# Patient Record
Sex: MALE | Race: WHITE | Employment: FULL TIME | URBAN - METROPOLITAN AREA
[De-identification: names, ages, dates, MRNs, and addresses within clinical notes are randomized per-mention and may not be internally consistent; named-entity substitution may affect disease eponyms.]

---

## 2017-01-05 ENCOUNTER — OFFICE VISIT (OUTPATIENT)
Dept: NEUROLOGY | Age: 52
End: 2017-01-05

## 2017-01-05 VITALS
HEIGHT: 69 IN | BODY MASS INDEX: 27.99 KG/M2 | HEART RATE: 67 BPM | WEIGHT: 189 LBS | DIASTOLIC BLOOD PRESSURE: 78 MMHG | RESPIRATION RATE: 16 BRPM | OXYGEN SATURATION: 95 % | TEMPERATURE: 97.9 F | SYSTOLIC BLOOD PRESSURE: 122 MMHG

## 2017-01-05 DIAGNOSIS — R41.3 MEMORY LOSS: Primary | ICD-10-CM

## 2017-01-05 DIAGNOSIS — R47.89 WORD FINDING DIFFICULTY: ICD-10-CM

## 2017-01-05 DIAGNOSIS — F43.22 ADJUSTMENT DISORDER WITH ANXIETY: ICD-10-CM

## 2017-01-05 DIAGNOSIS — R42 DIZZY SPELLS: ICD-10-CM

## 2017-01-05 NOTE — PATIENT INSTRUCTIONS
Information Regarding Testing     If you have physican order for a test or a medication denied by your insurance company, this does not mean the test or medication is not appropriate for you as that is a medical decision, not a decision to be made by an insurance company representative or by an [facility] physician who has not interviewed and examined you. This is a decision to be made between you and your physician. The denial of services is a contractual matter between you and your insurance company, not an issue between your physician and the insurance company. If your test or medication is denied, you can take the following steps to help resolve the issue:    1. File a complaint with the [facility] regarding your insurance company's denial of services ordered for you. You can do this either by calling them directly or by completing an on-line complaint form on the [employer]. This can be found at www.[employer]    2. Also file a formal complaint with your insurance company and ask to have the name of the person denying the service so that you may explore a legal option should you be harmed by this denial of service. Again, the fact the insurance company will not pay for the service does not mean it is not medically necessary and I would encourage you to follow through with the plan that was made with your physician    3. File a written complaint with your employer so your employer and benefit manager is aware of the poor coverage they are providing their employees. If you have medicare/medicaid, complain to your representative in the House and to your Bina Ambrose.         [facility]   Statement to Patients  April 1, 2014      In an effort to ensure the large volume of patient prescription refills is processed in the most efficient and expeditious manner, we are asking our patients to assist us by calling your Pharmacy for all prescription refills, this will include also your  Mail Order Pharmacy. The pharmacy will contact our office electronically to continue the refill process. Please do not wait until the last minute to call your pharmacy. We need at least 48 hours (2days) to fill prescriptions. We also encourage you to call your pharmacy before going to  your prescription to make sure it is ready. With regard to controlled substance prescription refill requests (narcotic refills) that need to be picked up at our office, we ask your cooperation by providing us with at least 72 hours (3days) notice that you will need a refill. We will not refill narcotic prescription refill requests after 4:00pm on any weekday, Monday through Thursday, or after 2:00pm on Fridays, or on the weekends. We encourage everyone to explore another way of getting your prescription refill request processed using [employer], our patient web portal through our electronic medical record system. [employer] is an efficient and effective way to communicate your medication request directly to the office and  downloadable as an deangelo on your smart phone . [employer] also features a review functionality that allows you to view your medication list as well as leave messages for your physician. Are you ready to get connected? If so please review the attatched instructions or speak to any of our staff to get you set up right away! Thank you so much for your cooperation. Should you have any questions please contact our Practice Administrator. The Physicians and Staff,  [facility]         If we have ordered testing for you, we do not call patients with results and we do not give test results over the phone. We schedule follow up appointments so that your results can be discussed in person and any questions you have regarding them may be addressed.   If something of concern is revealed on your test, we will call you for a sooner follow up appointment. Additionally, results may be found by using the My Chart feature and one of our patient service representatives at the  can give you instructions on how to access this feature of our electronic medical record system. Thank you for choosing [facility] for your     care. You may receive a survey about your visit. We appreciate you taking time     to complete this survey as we use your feedback to improve our services. We     realize we are not perfect, but we strive to provide excellent care. Learning About Living [place]  What is a living will? A living will is a legal form you use to write down the kind of care you want at the end of your life. It is used by the health professionals who will treat you if you aren't able to decide for yourself. If you put your wishes in writing, your loved ones and others will know what kind of care you want. They won't need to guess. This can ease your mind and be helpful to others. A living will is not the same as an estate or property will. An estate will explains what you want to happen with your money and property after you die. Is a living will a legal document? A living will is a legal document. Each state has its own laws about living tanner. If you move to another state, make sure that your living will is legal in the state where you now live. Or you might use a universal form that has been approved by many states. This kind of form can sometimes be completed and stored online. Your electronic copy will then be available wherever you have a connection to the Internet. In most cases, doctors will respect your wishes even if you have a form from a different state. · You don't need an  to complete a living will.  But legal advice can be helpful if your state's laws are unclear, your health history is complicated, or your family can't agree on what should be in your living will.  · You can change your living will at any time. Some people find that their wishes about end-of-life care change as their health changes. · In addition to making a living will, think about completing a medical power of  form. This form lets you name the person you want to make end-of-life treatment decisions for you (your \"health care agent\") if you're not able to. Many hospitals and nursing homes will give you the forms you need to complete a living will and a medical power of . · Your living will is used only if you can't make or communicate decisions for yourself anymore. If you become able to make decisions again, you can accept or refuse any treatment, no matter what you wrote in your living will. · Your state may offer an online registry. This is a place where you can store your living will online so the doctors and nurses who need to treat you can find it right away. What should you think about when creating a living will? Talk about your end-of-life wishes with your family members and your doctor. Let them know what you want. That way the people making decisions for you won't be surprised by your choices. Think about these questions as you make your living will:  · Do you know enough about life support methods that might be used? If not, talk to your doctor so you know what might be done if you can't breathe on your own, your heart stops, or you're unable to swallow. · What things would you still want to be able to do after you receive life-support methods? Would you want to be able to walk? To speak? To eat on your own? To live without the help of machines? · If you have a choice, where do you want to be cared for? In your home? At a hospital or nursing home? · Do you want certain Nondenominational practices performed if you become very ill? · If you have a choice at the end of your life, where would you prefer to die? At home? In a hospital or nursing home? Somewhere else?   · Would you prefer to be buried or cremated? · Do you want your organs to be donated after you die? What should you do with your living will? · Make sure that your family members and your health care agent have copies of your living will. · Give your doctor a copy of your living will to keep in your medical record. If you have more than one doctor, make sure that each one has a copy. · You may want to put a copy of your living will where it can be easily found. Where can you learn more? Go to http://santo-leslie.info/. Enter M512 in the search box to learn more about \"Learning About Living [place]. \"  Current as of: February 24, 2016  Content Version: 11.1  © 1059-7208 [employer]. Care instructions adapted under license by [employer] (which disclaims liability or warranty for this information). If you have questions about a medical condition or this instruction, always ask your healthcare professional. [address] any warranty or liability for your use of this information.

## 2017-01-05 NOTE — PROGRESS NOTES
Follow up for results for spells and memory loss. No significant changes in vertigo or slurred speech at times since last visit. Continues with difficulty with word finding. No acute problems problems.

## 2017-01-05 NOTE — MR AVS SNAPSHOT
Visit Information Date & Time Provider Department Dept. Phone Encounter #  
 1/5/2017  8:20 AM Iva Potts MD Neurology [facility] [phone] [id number] Upcoming Health Maintenance Date Due DTaP/Tdap/Td series (1 - Tdap) 10/27/1988 INFLUENZA AGE 9 TO ADULT 8/1/2016 Allergies as of 1/5/2017  Review Complete On: 1/5/2017 By: Iva Potts MD  
 No Known Allergies Current Immunizations  Never Reviewed No immunizations on file. Not reviewed this visit You Were Diagnosed With   
  
 Codes Comments Memory loss    -  Primary ICD-10-CM: R41.3 ICD-9-CM: 780.93 Vitals BP Pulse Temp Resp Height(growth percentile) Weight(growth percentile) 122/78 67 97.9 °F (36.6 °C) 16 5' 9\" (1.753 m) 189 lb (85.7 kg) SpO2 BMI Smoking Status 95% 27.91 kg/m2 Former Smoker Vitals History BMI and BSA Data Body Mass Index Body Surface Area  
 27.91 kg/m 2 2.04 m 2 Your Updated Medication List  
  
   
This list is accurate as of: 1/5/17  9:00 AM.  Always use your most recent med list. amLODIPine-benazepril 5-10 mg per capsule Commonly known as:  LOTREL  
TK 1 C PO QD  
  
 atorvastatin 20 mg tablet Commonly known as:  LIPITOR Take  by mouth daily. meclizine 25 mg tablet Commonly known as:  ANTIVERT Take  by mouth three (3) times daily as needed. meloxicam 15 mg tablet Commonly known as:  MOBIC  
15 mg as needed. TYLENOL PM PO Take  by mouth Before breakfast, lunch, dinner and at bedtime. Referral Information Referral ID Referred By Referred To  
  
 [id number] JUSTICE, [address], PsyD [place] [address] [facility], [address] [facility] Phone: [phone] Fax: [phone] Visits Status Start Date End Date 1 New Request 1/5/17 1/5/18 If your referral has a status of pending review or denied, additional information will be sent to support the outcome of this decision. Patient Instructions Information Regarding Testing If you have physican order for a test or a medication denied by your insurance company, this does not mean the test or medication is not appropriate for you as that is a medical decision, not a decision to be made by an insurance company representative or by an [facility] physician who has not interviewed and examined you. This is a decision to be made between you and your physician. The denial of services is a contractual matter between you and your insurance company, not an issue between your physician and the insurance company. If your test or medication is denied, you can take the following steps to help resolve the issue: 1. File a complaint with the [facility] of Insurance regarding your insurance company's denial of services ordered for you. You can do this either by calling them directly or by completing an on-line complaint form on the [employer]. This can be found at www.[place].[employer] 2. Also file a formal complaint with your insurance company and ask to have the name of the person denying the service so that you may explore a legal option should you be harmed by this denial of service. Again, the fact the insurance company will not pay for the service does not mean it is not medically necessary and I would encourage you to follow through with the plan that was made with your physician 3. File a written complaint with your employer so your employer and benefit manager is aware of the poor coverage they are providing their employees. If you have medicare/medicaid, complain to your representative in the House and to your Bina Ambrose. PRESCRIPTION REFILL POLICY [facility] Statement to Patients April 1, 2014 In an effort to ensure the large volume of patient prescription refills is processed in the most efficient and expeditious manner, we are asking our patients to assist us by calling your Pharmacy for all prescription refills, this will include also your  Mail Order Pharmacy. The pharmacy will contact our office electronically to continue the refill process. Please do not wait until the last minute to call your pharmacy. We need at least 48 hours (2days) to fill prescriptions. We also encourage you to call your pharmacy before going to  your prescription to make sure it is ready. With regard to controlled substance prescription refill requests (narcotic refills) that need to be picked up at our office, we ask your cooperation by providing us with at least 72 hours (3days) notice that you will need a refill. We will not refill narcotic prescription refill requests after 4:00pm on any weekday, Monday through Thursday, or after 2:00pm on Fridays, or on the weekends. We encourage everyone to explore another way of getting your prescription refill request processed using [employer], our patient web portal through our electronic medical record system. [employer] is an efficient and effective way to communicate your medication request directly to the office and  downloadable as an deangelo on your smart phone . [employer] also features a review functionality that allows you to view your medication list as well as leave messages for your physician. Are you ready to get connected? If so please review the attatched instructions or speak to any of our staff to get you set up right away! Thank you so much for your cooperation. Should you have any questions please contact our Practice Administrator. The Physicians and Staff,  [facility] If we have ordered testing for you, we do not call patients with results and we do not give test results over the phone.   We schedule follow up appointments so that your results can be discussed in person and any questions you have regarding them may be addressed. If something of concern is revealed on your test, we will call you for a sooner follow up appointment. Additionally, results may be found by using the My Chart feature and one of our patient service representatives at the  can give you instructions on how to access this feature of our electronic medical record system. Thank you for choosing [employer] [facility] for your  
 
care. You may receive a survey about your visit. We appreciate you taking time  
 
to complete this survey as we use your feedback to improve our services. We  
 
realize we are not perfect, but we strive to provide excellent care. Francis Guillermo [address] What is a living will? A living will is a legal form you use to write down the kind of care you want at the end of your life. It is used by the health professionals who will treat you if you aren't able to decide for yourself. If you put your wishes in writing, your loved ones and others will know what kind of care you want. They won't need to guess. This can ease your mind and be helpful to others. A living will is not the same as an estate or property will. An estate will explains what you want to happen with your money and property after you die. Is a living will a legal document? A living will is a legal document. Each state has its own laws about living tanner. If you move to another state, make sure that your living will is legal in the state where you now live. Or you might use a universal form that has been approved by many states. This kind of form can sometimes be completed and stored online. Your electronic copy will then be available wherever you have a connection to the Internet. In most cases, doctors will respect your wishes even if you have a form from a different state. · You don't need an  to complete a living will. But legal advice can be helpful if your state's laws are unclear, your health history is complicated, or your family can't agree on what should be in your living will. · You can change your living will at any time. Some people find that their wishes about end-of-life care change as their health changes. · In addition to making a living will, think about completing a medical power of  form. This form lets you name the person you want to make end-of-life treatment decisions for you (your \"health care agent\") if you're not able to. Many hospitals and nursing homes will give you the forms you need to complete a living will and a medical power of . · Your living will is used only if you can't make or communicate decisions for yourself anymore. If you become able to make decisions again, you can accept or refuse any treatment, no matter what you wrote in your living will. · Your state may offer an online registry. This is a place where you can store your living will online so the doctors and nurses who need to treat you can find it right away. What should you think about when creating a living will? Talk about your end-of-life wishes with your family members and your doctor. Let them know what you want. That way the people making decisions for you won't be surprised by your choices. Think about these questions as you make your living will: · Do you know enough about life support methods that might be used? If not, talk to your doctor so you know what might be done if you can't breathe on your own, your heart stops, or you're unable to swallow. · What things would you still want to be able to do after you receive life-support methods? Would you want to be able to walk? To speak? To eat on your own? To live without the help of machines? · If you have a choice, where do you want to be cared for? In your home? At a hospital or nursing home? · Do you want certain Mu-ism practices performed if you become very ill? · If you have a choice at the end of your life, where would you prefer to die? At home? In a hospital or nursing home? Somewhere else? · Would you prefer to be buried or cremated? · Do you want your organs to be donated after you die? What should you do with your living will? · Make sure that your family members and your health care agent have copies of your living will. · Give your doctor a copy of your living will to keep in your medical record. If you have more than one doctor, make sure that each one has a copy. · You may want to put a copy of your living will where it can be easily found. Where can you learn more? Go to http://santo-leslie.info/. Enter O966 in the search box to learn more about \"Learning About Living [place]. \" Current as of: February 24, 2016 Content Version: 11.1 © 9220-0716 [employer]. Care instructions adapted under license by [employer] (which disclaims liability or warranty for this information). If you have questions about a medical condition or this instruction, always ask your healthcare professional. [place] [address] any warranty or liability for your use of this information. Introducing [facility]! [employer] introduces [employer] patient portal. Now you can access parts of your medical record, email your doctor's office, and request medication refills online. 1. In your internet browser, go to [email or web address] 2. Click on the First Time User? Click Here link in the Sign In box. You will see the New Member Sign Up page. 3. Enter your [employer] Access Code exactly as it appears below. You will not need to use this code after youve completed the sign-up process. If you do not sign up before the expiration date, you must request a new code. · [employer] Access Code: [email or web address] Expires: 3/14/2017 11:32 AM 
 
4. Enter the last four digits of your Social Security Number (xxxx) and Date of Birth (mm/dd/yyyy) as indicated and click Submit. You will be taken to the next sign-up page. 5. Create a Celltrix ID. This will be your Celltrix login ID and cannot be changed, so think of one that is secure and easy to remember. 6. Create a Celltrix password. You can change your password at any time. 7. Enter your Password Reset Question and Answer. This can be used at a later time if you forget your password. 8. Enter your e-mail address. You will receive e-mail notification when new information is available in [address] 9. Click Sign Up. You can now view and download portions of your medical record. 10. Click the Download Summary menu link to download a portable copy of your medical information. If you have questions, please visit the Frequently Asked Questions section of the Celltrix website. Remember, Celltrix is NOT to be used for urgent needs. For medical emergencies, dial 911. Now available from your iPhone and Android! Please provide this summary of care documentation to your next provider. Your primary care clinician is listed as [employer]. If you have any questions after today's visit, please call [phone].

## 2017-01-05 NOTE — PROGRESS NOTES
[address] [facility],5Th [address]  [address] [facility]   [place] [address] [facility] [address] [facility] [place] [address]   [phone] Office   [phone] Fax      Neuropsychology    Initial Diagnostic Interview Note      Referral:  Nidia Munoz MD, Dr. Valdovinos  is a 54 y.o. right   male who was accompanied by his spouse to the initial clinical interview on 1/5/17. Please refer to his medical records for details pertaining to his history. Briefly, the patient reported that he completed high school and denied any history of previously diagnosed LD and/or receipt of special education services. He works as a [employer] in [place] - has been doing that for 22 years. He fumbles with memory, and defers to his spouse. Per the spouse, the patient, over the last several years, the patient seems like he is a step behind in terms of problem solving, verbal cues, things like that. Can be almost three steps behind. Has a hard time finding the right words. Happening a lot more. Will have a conversation and forget the content of conversation. Loses track of what he's going to say and the words don't come out. Was working on a generator yesterday, knew it started with a B, and couldn't get it out, and when he did get it out, he was stuck on on it. Doesn't remember what the word was, today. The reason he came here was that he is very concerned about it. Will go into a room and forget why. Forgets the content of conversations. Starts tasks and does not complete. Watches cooking show and forgets details. Forgets the specific channel on tv. Frustrating to him to figure out how to do that. Loses things in conversations. Has to think and rethink of what he's going to say. Brain will go somewhere else. Will get turned around when driving. Didn't know where he was - here in the [facility]. 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   Had been do that specific intersection many, many times. Told spouse about what happened, and he has been here for evaluation here. Gait is off as well. Father  of neurologic problems. Not dementia. Did have Shy-Drager, multiple system atrophy. Father  at 80, but started seeing signs at 79, maybe even before. Maybe even in 62s, he couldn't think of things. Was a high functioning individual.  No changes in sense of taste or smell. No change in libido. No change in appetite. Gets 2-3 hours. Can't shut brain off at night. Back pain also. Lots of death, illnesses over the past year, but things have settled in the past six months.   He is independent for medications, finances, day-to-day chores, ADLs, etc.      MMSE:    Clock: Normal    TOPF:  37    Historian: Good  Praxis: No UE apraxia  R/L Orientation: Intact to self and to other  Dress: within normal limits   Weight: within normal limits   Appearance/Hygiene: within normal limits   Gait: within normal limits   Assistive Devices: None  Mood: within normal limits   Affect: within normal limits   Comprehension: within normal limits   Thought Process: logical and goal oriented but loses train of thought   Expressive Language: mild WFD  Receptive Language: within normal limits   Motor:  No cognitive or motor perseveration  ETOH: 1/2 bottle of wine per night, 2 glasses  Tobacco: tobacco vapes, quit smoking real cigarettes three years ago  Illicit:  Denied  SI/HI: Denied  Psychosis: Denied  Insight: Within normal limits  Judgment: Within normal limits  Other Psych:      Past Medical History   Diagnosis Date    Fatigue     Hypertension     Joint pain     Vertigo        Past Surgical History   Procedure Laterality Date    Hx orthopaedic         No Known Allergies    Family History   Problem Relation Age of Onset    Parkinson's Disease Father     MS Brother        Social History   Substance Use Topics    Smoking status: Former Smoker    Smokeless tobacco: None    Alcohol use Yes       Current Outpatient Prescriptions   Medication Sig Dispense Refill    amLODIPine-benazepril (LOTREL) 5-10 mg per capsule TK 1 C PO QD  0    meloxicam (MOBIC) 15 mg tablet 15 mg as needed. 0    atorvastatin (LIPITOR) 20 mg tablet Take  by mouth daily.  meclizine (ANTIVERT) 25 mg tablet Take  by mouth three (3) times daily as needed.  ACETAMINOPHEN/DIPHENHYDRAMINE (TYLENOL PM PO) Take  by mouth Before breakfast, lunch, dinner and at bedtime. Plan:  Obtain authorization for testing from insurance company. Report to follow once testing, scoring, and interpretation completed. ? Organic based neurocognitive issues versus mood disorder or combination of same. ? Problems organic, functional, or both? This note will not be viewable in [address]

## 2017-01-05 NOTE — PROGRESS NOTES
[facility]   [place] [address]   [facility]    [facility]   [phone] Fax               Chief Complaint   Patient presents with    Spells     follow up     Current Outpatient Prescriptions   Medication Sig Dispense Refill    amLODIPine-benazepril (LOTREL) 5-10 mg per capsule TK 1 C PO QD  0    meloxicam (MOBIC) 15 mg tablet 15 mg as needed. 0    atorvastatin (LIPITOR) 20 mg tablet Take  by mouth daily.  meclizine (ANTIVERT) 25 mg tablet Take  by mouth three (3) times daily as needed.  ACETAMINOPHEN/DIPHENHYDRAMINE (TYLENOL PM PO) Take  by mouth Before breakfast, lunch, dinner and at bedtime. No Known Allergies  Social History   Substance Use Topics    Smoking status: Former Smoker    Smokeless tobacco: None    Alcohol use Yes     Mr. Maikol Carrasquillo returns today in follow-up from his recent initial evaluation for complaints of dizziness and cognitive difficulty. His wife accompanies him today. He notes that he continues to have the same symptoms for which he presented. His MRI demonstrated nonspecific small vessel white matter changes. His carotid Doppler is personally reviewed and demonstrates 16-49% stenosis bilateral internal carotid artery. EEG is personally reviewed and normal.  Thyroid function and vitamin B12 are normal.    We discussed the results of his tests. We discussed the reassuring components of that. Both he and his wife today related history of his cognitive decline and abnormalities of speech and language. He relates yesterday he was speaking to a colleague trying to get a generator started in he could not say what he wanted to say. His wife has noticed his increased forgetfulness. His children have noticed as well and in fact the 15year-old stated that Mr. Sarkar's brain did not work properly.   There is a significant amount of concern regarding dementia versus other neurodegenerative disorders given his family history. We discussed questions related to that, diagnosis, testing, etc.    Examination  Visit Vitals    Resp 16    Ht 5' 9\" (1.753 m)    Wt 85.7 kg (189 lb)    BMI 27.91 kg/m2     He is a pleasant gentleman. He is awake alert oriented and conversant. His speech and language are normal.  No icterus. Impression/Plan  77-year-old gentleman with complaints of episodic dizziness and cognitive dysfunction with an MRI scan demonstrative of small vessel disease/aging and certainly no evidence of multiple sclerosis which she was quite concerned secondary to family members with such. He does have some mild stenosis in the carotids. Modify modifiable risk factors for vascular disease including controlling hypertension, cholesterol, avoiding tobacco, regular exercise, etc.    We discussed all of the questions he and his wife had as noted above relating to cognitive disturbance, processing issues, neurodegenerative disorder such as dementia as well as his family history of Shy-Drager and multiple sclerosis. His wife again continues to note that his cognitive ability has declined significantly. Given this we will proceed with a neuropsychological evaluation and the patient will follow-up after that has been completed. Total time: 30 min   Counseling / coordination time: 30 min   > 50% counseling / coordination?: Yes re: as documented above      This note was created using voice recognition software. Despite editing, there may be syntax errors. This note will not be viewable in [address]

## 2017-01-18 ENCOUNTER — OFFICE VISIT (OUTPATIENT)
Dept: NEUROLOGY | Age: 52
End: 2017-01-18

## 2017-01-18 DIAGNOSIS — F33.0 DEPRESSION, MAJOR, RECURRENT, MILD (HCC): ICD-10-CM

## 2017-01-18 DIAGNOSIS — F90.0 ATTENTION DEFICIT HYPERACTIVITY DISORDER (ADHD), INATTENTIVE TYPE, MILD: Chronic | ICD-10-CM

## 2017-01-18 DIAGNOSIS — R41.3 MEMORY LOSS: Primary | ICD-10-CM

## 2017-01-24 NOTE — PROGRESS NOTES
[address] [facility],5Th Floor  [address] [facility] [address]   [place] [address] [place] [address] [facility]   [place] Alfredo   [phone] Office   [phone] Fax      Neuropsychological Evaluation Report      Referral:  Lathan Barthel, MD, Dr. Ramirez Amadeo is a 54 y.o. right   male who was accompanied by his spouse to the initial clinical interview on 1/5/17. Please refer to his medical records for details pertaining to his history. Briefly, the patient reported that he completed high school and denied any history of previously diagnosed LD and/or receipt of special education services. He works as a [employer] in [place] - has been doing that for 22 years. He fumbles with memory, and defers to his spouse. Per the spouse, the patient, over the last several years, the patient seems like he is a step behind in terms of problem solving, verbal cues, things like that. Can be almost three steps behind. Has a hard time finding the right words. Happening a lot more. Will have a conversation and forget the content of conversation. Loses track of what he's going to say and the words don't come out. Was working on a generator yesterday, knew it started with a B, and couldn't get it out, and when he did get it out, he was stuck on on it. Doesn't remember what the word was, today. The reason he came here was that he is very concerned about it. Will go into a room and forget why. Forgets the content of conversations. Starts tasks and does not complete. Watches cooking show and forgets details. Forgets the specific channel on tv. Frustrating to him to figure out how to do that. Loses things in conversations. Has to think and rethink of what he's going to say. Brain will go somewhere else. Will get turned around when driving. Didn't know where he was - here in the [facility]. 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   Had been do that specific intersection many, many times. Told spouse about what happened, and he has been here for evaluation here. Gait is off as well. Father  of neurologic problems. Not dementia. Did have Shy-Drager, multiple system atrophy. Father  at 80, but started seeing signs at 79, maybe even before. Maybe even in 62s, he couldn't think of things. Was a high functioning individual.  No changes in sense of taste or smell. No change in libido. No change in appetite. Gets 2-3 hours. Can't shut brain off at night. Back pain also. Lots of death, illnesses over the past year, but things have settled in the past six months.   He is independent for medications, finances, day-to-day chores, ADLs, etc.      MMSE:    Clock: Normal    TOPF:  37    Historian: Good  Praxis: No UE apraxia  R/L Orientation: Intact to self and to other  Dress: within normal limits   Weight: within normal limits   Appearance/Hygiene: within normal limits   Gait: within normal limits   Assistive Devices: None  Mood: within normal limits   Affect: within normal limits   Comprehension: within normal limits   Thought Process: logical and goal oriented but loses train of thought   Expressive Language: mild WFD  Receptive Language: within normal limits   Motor:  No cognitive or motor perseveration  ETOH: 1/2 bottle of wine per night, 2 glasses  Tobacco: tobacco vapes, quit smoking real cigarettes three years ago  Illicit:  Denied  SI/HI: Denied  Psychosis: Denied  Insight: Within normal limits  Judgment: Within normal limits  Other Psych:      Past Medical History   Diagnosis Date    Fatigue     Hypertension     Joint pain     Vertigo        Past Surgical History   Procedure Laterality Date    Hx orthopaedic         No Known Allergies    Family History   Problem Relation Age of Onset    Parkinson's Disease Father     MS Brother        Social History   Substance Use Topics    Smoking status: Former Smoker    Smokeless tobacco: None    Alcohol use Yes       Current Outpatient Prescriptions   Medication Sig Dispense Refill    amLODIPine-benazepril (LOTREL) 5-10 mg per capsule TK 1 C PO QD  0    meloxicam (MOBIC) 15 mg tablet 15 mg as needed. 0    atorvastatin (LIPITOR) 20 mg tablet Take  by mouth daily.  meclizine (ANTIVERT) 25 mg tablet Take  by mouth three (3) times daily as needed.  ACETAMINOPHEN/DIPHENHYDRAMINE (TYLENOL PM PO) Take  by mouth Before breakfast, lunch, dinner and at bedtime. Plan:  Obtain authorization for testing from insurance company. Report to follow once testing, scoring, and interpretation completed. ? Organic based neurocognitive issues versus mood disorder or combination of same. ? Problems organic, functional, or both? This note will not be viewable in [address] Neuropsychological Test Results  Patient Testing 1/18/17 Report Completed 1/24/17  A Psychometrist Assisted w/ portions of this evaluation while under my direct supervision    The following assessment procedures were performed:      Neuropsychologist Performed, Interpreted, & Reported: Neuropsychological Mental Status Exam, Revised Memory & Behavior Checklist, Mini Mental State Exam, Clock Drawing Test, Test Of Premorbid Functioning, Nora-Melzack Pain Questionnaire,  History Taking  & Clinical Interview With The Patient, Additional History Taking w/ The Patient's Spouse, Review Of Available Records. Psychometrist Administered & Neuropsychologist Interpreted & Neuropsychologist Reported: Finger Tapping Test, Grooved Pegboard Test, Speech-Sounds Perception Test, [employer], Wechsler Adult Intelligence Scale - IV, Verbal Fluency Tests, Dale & Dale - Revised, Trailmaking Test Parts A & B, [place] Verbal Learning Test - II, Carlos Alberto Complex Figure Test, Beck Depression Inventory - II, Beck Anxiety Inventory, Personality Assessment Inventory.      Computer Administered & Neuropsychologist Interpreted & Neuropsychologist Reported: Nury Continuous Performance Test - III      Test Findings:  Test Findings:  Note:  The patients raw data have been compared with currently available norms which include demographic corrections for age, gender, and/or education. Sometimes, the patients scores are compared to demographically similar individuals as close to the patients age, education level, etc., as possible. \"Average\" is viewed as being +/- 1 standard deviation (SD) from the stated mean for a particular test score. \"Low average\" is viewed as being between 1 and 2 SD below the mean, and above average is viewed as being 1 and 2 SD above the mean. Scores falling in the borderline range (between 1-1/2 and 2 SD below the mean) are viewed with particular attention as to whether they are normal or abnormal neurocognitive test scores. Other methods of inference in analyzing the test data are also utilized, including the pattern and range of scores in the profile, bilateral motor functions, and the presence, if any, of pathognomonic signs. Behaviorally, the patient was friendly and cooperative and appeared motivated to perform well during this examination. Within this context, the results of this evaluation are viewed as a valid reflection of the patients actual neurocognitive and emotional status. His structured word list fluency, as assessed by the FAS Test, was within the below average range with a T score of 42. Category fluency was within the average range with a T score of 49. Confrontation naming ability, as assessed by the [facility] - Revised, was within the mildly impaired range at 52/60 correct (T = 39).   This pattern of performance is indicative of a patient who is at increased risk for day-to-day problems with confrontation naming and verbal fluency was normal.      The patient was administered the Nury Continuous Performance Test - III, a computer-administered test of sustained attention, and review of the subscales within this instrument revealed mild concerns for inattentiveness without impulsivity. Verbal auditory attention and discrimination, as assessed by the Speech-Sounds Perception Test (T = 40) was within the below average range. Nonverbal auditory attention and discrimination, as assessed by the [facility] Rhythm Test (T = 35) was within the mildly impaired range. This pattern of performance is indicative of a patient who is at increased risk for day-to-day problems with sustained visual attention/concentration and nonverbal auditory attention and discrimination related abilities. The patient was administered the Wechsler Adult Intelligence Scale - IV. There was no clinically significant difference between his low average range Working Memory Index score of 80 (9th %ile) and his average range Processing Speed Index score of 92 (30th %ile). This pattern of performance is not indicative of a patient who is at increased risk for day-to-day problems with working memory and processing speed. His Verbal Comprehension Index score of 95 (37th %ile) was within the average range and his Perceptual Reasoning Index score of 81 (10th %ile) was low average. See Appendix I (scanned into media section of this EMR) for full breakdown of IQ test scores. Perceptual reasoning and working memory are statistically weaker than would be expected based on his performance on a measure assessing premorbid functioning levels. The patient was administered the [place] Verbal Learning Test  - II and generated a normal range (and positive) learning curve over five repeated auditory word list learning trials. An interference trial was within normal limits. Free and cued, short and long delayed recall were all within normal limits. Recognition and forced choice recall were within normal limits.   This pattern of performance is not  indicative of a patient who is at increased risk for day-to-day problems with auditory learning and/or memory. The patients performance on the copy portion of the Carlos Alberto Complex Figure Test was within normal limits. Recall for the complex design was within severely impaired after a short delay (<1st %ile) and also after a long delay (<1st %ile). However, his recognition recall was normal (34th %ile). This pattern of performance is  indicative of a patient who is at increased risk for day-to-day problems with visual memory. However, when recognition recall is normal, there is often functional interference. Simple timed visual motor sequencing (Trailmaking Test Part A) was within the below average range with a T score of 43. His performance on a similar, but more complex task of timed visual motor sequencing (Trailmaking Test Part B) was within the below average range with a T score of 41. He made zero sequencing errors on this latter test.   Taken together, this pattern of performance is not indicative of a patient who is at increased risk for day-to-day problems with executive functioning. Motor speed for finger tapping was within the normal range for his dominant hand and within the mildly impaired range for his nondominant hand. Fine motor dexterity was within the below average range for his dominant hand and mildly impaired for his nondominant hand. This is potentially a lateralized (right hemisphere) brain dysfunction for which neurological correlation is indicated. The patient rated his current level of pain as \"0/5 - No Pain\" on the Nora-Melzack Pain Questionnaire. His Pearson Depression Inventory -II score of 8 was within the minimally depressed range. His Pearson Anxiety Inventory score of 7 reflected minimal anxiety. The patient was administered the Personality Assessment Inventory and a high level of defensiveness was present. Despite this defensiveness, there are numerous physical signs of depression.   The personality profile is otherwise normal. Impressions & Recommendations: This patient generated a predominantly normal range Neuropsychological Evaluation with respect to neurocognitive functioning. In this regard, mild impairments are noted on tasks of attention, confrontation naming, and nondominant handed motor skills. At the same time, his performance across all other neurocognitive domains assessed, including mental status, verbal fluency, verbal auditory attention, auditory learning, auditory memory, visual organization, visual recognition recall, and executive functioning remain normal.  Emotionally, the patient was highly defensive in terms of response style on personality testing. Within that context, there is support for numerous physical signs of depression. In addition, the neurocognitive profile is also consistent with functional interference. In my opinion, the reported (but not clinically corroborated) decline in memory is secondary to depression in an aging individual who also has chronic ADHD- Inattentive concerns. I do not see evidence of a dementia type process here. I hope he is relieved by that information. I suggest consideration for medication management of the above, as well as consideration for mental health counseling. I do not see him as in need of day-to-day supervision at this time. I am not concerned about driving. I am not concerned about competency. Baseline now established. Follow up prn. Clinical correlation is, of course, indicated. I will discuss these findings with the patient when he follows up with me in the near future. A follow up Neuropsychological Evaluation is indicated on a prn basis, especially if there are any cognitive and/or emotional changes. DIAGNOSES:  The Referral Diagnosis of Memory Loss  - IS NOT SUPPORTED     Chronic ADHD- Inattentive - Mild     Major Depression - Mild To Moderate     The above information is based upon information currently available to me.   If there is any additional information of which I am currently unaware, I would be more than happy to review it upon having it made available to me. Thank you for the opportunity to see this interesting individual.     Sincerely,       Deja Sparks. Radha Lopez, Mirza        dd  CC:  Nahid Blair MD, Dr. Hanny Jackson        2 units -[id number]-  Record review. Review of history provided by patient. Review of collaborative information. Testing by Clinician. Review of raw data. Scoring. Report writing of individual tests administered by Clinician. Integration of individual tests administered by psychometrist (that were previously reported and billed under psychometry code below) with testing by clinician and review of records/history/collaborative information. Case Conceptualization, Report writing. Coordination Of Care. 5 units  -[id number] - Psychometrist test prep, administration, and scoring under clinician's direct supervision. Clinical interpretation of individual tests administered by psychometrist .  Clinician report of individual tests administered by psychometrist.    1 unit - [id number] - Computer testing and scoring and clinician's interpretation of computer-administered test(s)    \"Unit\" is defined by CPT/National Guidelines (31 - 60 minutes). Integral services including scoring of raw data, data interpretation, case conceptualization, report writing etcetera were initiated after the patient finished testing/raw data collected and was completed on the date the report was signed.

## 2017-03-17 ENCOUNTER — OFFICE VISIT (OUTPATIENT)
Dept: NEUROLOGY | Age: 52
End: 2017-03-17

## 2017-03-17 DIAGNOSIS — R41.3 MEMORY LOSS: Primary | ICD-10-CM

## 2017-03-17 DIAGNOSIS — R47.89 WORD FINDING DIFFICULTY: ICD-10-CM

## 2017-03-17 DIAGNOSIS — F33.0 DEPRESSION, MAJOR, RECURRENT, MILD (HCC): ICD-10-CM

## 2017-03-17 NOTE — PROGRESS NOTES
Office feedback session with the patient and spouse today. I reviewed the results of the recent Neuropsychological Evaluation, including discussing individual tests as well as patient's areas of neurocognitive strength versus weakness. Education was provided regarding my diagnostic impressions, and we discussed treatment plan/options. I also answered numerous questions related to the clinical findings, including discussing various methods to improve cognition and mood. Counseling provided regarding mood and cognition. The patient will follow up with the referring provider, and reported being very pleased with the services provided. [facility] minutes with patient and spouse, record review, coordination of care. Records provided. Spouse says I'm full of horseshit but they do understand no organic memory loss. We discussed: This patient generated a predominantly normal range Neuropsychological Evaluation with respect to neurocognitive functioning. In this regard, mild impairments are noted on tasks of attention, confrontation naming, and nondominant handed motor skills. At the same time, his performance across all other neurocognitive domains assessed, including mental status, verbal fluency, verbal auditory attention, auditory learning, auditory memory, visual organization, visual recognition recall, and executive functioning remain normal. Emotionally, the patient was highly defensive in terms of response style on personality testing. Within that context, there is support for numerous physical signs of depression. In addition, the neurocognitive profile is also consistent with functional interference.      In my opinion, the reported (but not clinically corroborated) decline in memory is secondary to depression in an aging individual who also has chronic ADHD- Inattentive concerns. I do not see evidence of a dementia type process here.  I hope he is relieved by that information. I suggest consideration for medication management of the above, as well as consideration for mental health counseling. I do not see him as in need of day-to-day supervision at this time. I am not concerned about driving. I am not concerned about competency. Baseline now established. Follow up prn. Clinical correlation is, of course, indicated.       I will discuss these findings with the patient when he follows up with me in the near future.  A follow up Neuropsychological Evaluation is indicated on a prn basis, especially if there are any cognitive and/or emotional changes.      DIAGNOSES:  The Referral Diagnosis of Memory Loss  - IS NOT SUPPORTED  Chronic ADHD- Inattentive - Mild  Major Depression - Mild To Moderate